# Patient Record
Sex: MALE | Race: BLACK OR AFRICAN AMERICAN
[De-identification: names, ages, dates, MRNs, and addresses within clinical notes are randomized per-mention and may not be internally consistent; named-entity substitution may affect disease eponyms.]

---

## 2018-09-04 ENCOUNTER — HOSPITAL ENCOUNTER (EMERGENCY)
Dept: HOSPITAL 62 - ER | Age: 33
Discharge: LEFT BEFORE DISCHARGE | End: 2018-09-04
Payer: SELF-PAY

## 2018-09-04 DIAGNOSIS — R10.9: Primary | ICD-10-CM

## 2018-09-04 DIAGNOSIS — Z53.21: ICD-10-CM

## 2019-09-16 ENCOUNTER — HOSPITAL ENCOUNTER (EMERGENCY)
Dept: HOSPITAL 62 - ER | Age: 34
Discharge: HOME | End: 2019-09-16
Payer: SELF-PAY

## 2019-09-16 VITALS — SYSTOLIC BLOOD PRESSURE: 130 MMHG | DIASTOLIC BLOOD PRESSURE: 89 MMHG

## 2019-09-16 DIAGNOSIS — F17.200: ICD-10-CM

## 2019-09-16 DIAGNOSIS — H00.014: Primary | ICD-10-CM

## 2019-09-16 PROCEDURE — 99283 EMERGENCY DEPT VISIT LOW MDM: CPT

## 2019-09-16 NOTE — ER DOCUMENT REPORT
ED Eye Complaint





- General


Chief Complaint: Eye Problem


Stated Complaint: LEFT EYE PAIN, IRRITATION


TRAVEL OUTSIDE OF THE [place] IN LAST 30 DAYS: No





- HPI


Patient complains to provider of: left eye irritation


Onset: Last week


Eye location: Left


Occurred at: Home


Quality of pain: Burning


Severity: Mild


Pain Level: Denies


Safety glasses worn: No


Contact lenses worn: No


Associated symptoms: None


Notes: 





33 year old male has tried lid scrubs and warm compress to left upper eye lid 

syte for a week.  No really improving.  No fever or chills.  No contact lens. 





- Related Data


Allergies/Adverse Reactions: 


                                        





No Known Allergies Allergy (Verified 09/16/19 07:50)


   











Past Medical History





- Social History


Smoking Status: Current Every Day Smoker


Family History: None


Patient has suicidal ideation: No


Patient has homicidal ideation: No


Endocrine Medical History: Reports: Hx Diabetes Mellitus Type 2 - borderline


Renal/ Medical History: Denies: Hx Peritoneal Dialysis





Review of Systems





- Review of Systems


Constitutional: No symptoms reported


EENT: See HPI


Cardiovascular: No symptoms reported


Respiratory: No symptoms reported


Gastrointestinal: No symptoms reported


Genitourinary: No symptoms reported


Male Genitourinary: No symptoms reported


Musculoskeletal: No symptoms reported


Skin: No symptoms reported


Hematologic/Lymphatic: No symptoms reported


Neurological/Psychological: No symptoms reported





Physical Exam





- Vital signs


Vitals: 


                                        











Temp Pulse Resp BP Pulse Ox


 


 97.9 F   87   20   138/80 H  97 


 


 09/16/19 07:53  09/16/19 07:53  09/16/19 07:53  09/16/19 07:53  09/16/19 07:53











Interpretation: Normal





- General


General appearance: Appears well, Alert





- HEENT


Head: Normocephalic, Atraumatic


Eyes: Other - mild erythema and sts consistent with hordeolum left upper lid


Pupils: PERRL


Visual acuity- Right eye: 20/30


Visual acuity- Left eye: 20/40


Corrective lenses worn: No





- Respiratory


Respiratory status: No respiratory distress


Chest status: Nontender


Breath sounds: Normal


Chest palpation: Normal





- Cardiovascular


Rhythm: Regular


Heart sounds: Normal auscultation


Murmur: No





- Abdominal


Inspection: Normal


Distension: No distension


Bowel sounds: Normal


Tenderness: Nontender


Organomegaly: No organomegaly





- Back


Back: Nontender





- Extremities


General upper extremity: Normal inspection, Nontender, Normal color, Normal ROM,

Normal temperature


General lower extremity: Normal inspection, Nontender, Normal color, Normal ROM,

Normal temperature, Normal weight bearing.  No: Maynor's sign





- Neurological


Neuro grossly intact: Yes


Cognition: Normal


Orientation: AAOx4


[place] Coma Scale Eye Opening: Spontaneous


[place] Coma Scale Verbal: Oriented


[place] Coma Scale Motor: Obeys Commands


[place] Coma Scale Total: 15


Speech: Normal


Motor strength normal: LUE, RUE, LLE, RLE


Sensory: Normal





- Psychological


Associated symptoms: Normal affect, Normal mood





- Skin


Skin Temperature: Warm


Skin Moisture: Dry


Skin Color: Normal





Course





- Vital Signs


Vital signs: 


                                        











Temp Pulse Resp BP Pulse Ox


 


 97.9 F   87   20   138/80 H  97 


 


 09/16/19 07:53  09/16/19 07:53  09/16/19 07:53  09/16/19 07:53  09/16/19 07:53














Discharge





- Discharge


Clinical Impression: 


Hordeolum externum (stye)


Qualifiers:


 Laterality: left Eyelid: upper Qualified Code(s): H00.014 - Hordeolum externum 

left upper eyelid





Condition: Good


Disposition: HOME, SELF-CARE


Instructions:  Antibiotic Therapy ([facility]), Eyedrop Use ([facility])


Additional Instructions: 


Take the medicine as directed.  Please return here for any problems or concerns.




Prescriptions: 


Erythromycin Base [Erythromycin Oph 1 gm Oint Ud] 1 applic OP QID #1 tube


Cephalexin Monohydrate [Keflex 500 mg Capsule] 500 mg PO TID #30 capsule

## 2020-11-25 ENCOUNTER — HOSPITAL ENCOUNTER (EMERGENCY)
Dept: HOSPITAL 62 - ER | Age: 35
Discharge: HOME | End: 2020-11-25
Payer: SELF-PAY

## 2020-11-25 VITALS — SYSTOLIC BLOOD PRESSURE: 124 MMHG | DIASTOLIC BLOOD PRESSURE: 95 MMHG

## 2020-11-25 DIAGNOSIS — F17.210: ICD-10-CM

## 2020-11-25 DIAGNOSIS — H57.89: ICD-10-CM

## 2020-11-25 DIAGNOSIS — G44.209: Primary | ICD-10-CM

## 2020-11-25 DIAGNOSIS — R50.9: ICD-10-CM

## 2020-11-25 DIAGNOSIS — F41.9: ICD-10-CM

## 2020-11-25 DIAGNOSIS — E11.9: ICD-10-CM

## 2020-11-25 DIAGNOSIS — H57.12: ICD-10-CM

## 2020-11-25 LAB
ADD MANUAL DIFF: NO
ALBUMIN SERPL-MCNC: 4.1 G/DL (ref 3.5–5)
ALP SERPL-CCNC: 84 U/L (ref 38–126)
ANION GAP SERPL CALC-SCNC: 8 MMOL/L (ref 5–19)
AST SERPL-CCNC: 17 U/L (ref 17–59)
BASOPHILS # BLD AUTO: 0 10^3/UL (ref 0–0.2)
BASOPHILS NFR BLD AUTO: 0.5 % (ref 0–2)
BILIRUB DIRECT SERPL-MCNC: 0.1 MG/DL (ref 0–0.4)
BILIRUB SERPL-MCNC: 0.6 MG/DL (ref 0.2–1.3)
BUN SERPL-MCNC: 13 MG/DL (ref 7–20)
CALCIUM: 9.2 MG/DL (ref 8.4–10.2)
CHLORIDE SERPL-SCNC: 103 MMOL/L (ref 98–107)
CO2 SERPL-SCNC: 29 MMOL/L (ref 22–30)
CRP SERPL-MCNC: 5.1 MG/L (ref ?–10)
EOSINOPHIL # BLD AUTO: 0 10^3/UL (ref 0–0.6)
EOSINOPHIL NFR BLD AUTO: 0.6 % (ref 0–6)
ERYTHROCYTE [DISTWIDTH] IN BLOOD BY AUTOMATED COUNT: 14.7 % (ref 11.5–14)
ERYTHROCYTE [SEDIMENTATION RATE] IN BLOOD: 17 MM/HR (ref 0–15)
GLUCOSE SERPL-MCNC: 116 MG/DL (ref 75–110)
HCT VFR BLD CALC: 42.4 % (ref 37.9–51)
HGB BLD-MCNC: 14.2 G/DL (ref 13.5–17)
LYMPHOCYTES # BLD AUTO: 2 10^3/UL (ref 0.5–4.7)
LYMPHOCYTES NFR BLD AUTO: 30 % (ref 13–45)
MCH RBC QN AUTO: 30.6 PG (ref 27–33.4)
MCHC RBC AUTO-ENTMCNC: 33.5 G/DL (ref 32–36)
MCV RBC AUTO: 91 FL (ref 80–97)
MONOCYTES # BLD AUTO: 0.8 10^3/UL (ref 0.1–1.4)
MONOCYTES NFR BLD AUTO: 11.3 % (ref 3–13)
NEUTROPHILS # BLD AUTO: 3.9 10^3/UL (ref 1.7–8.2)
NEUTS SEG NFR BLD AUTO: 57.6 % (ref 42–78)
PLATELET # BLD: 212 10^3/UL (ref 150–450)
POTASSIUM SERPL-SCNC: 3.8 MMOL/L (ref 3.6–5)
PROT SERPL-MCNC: 7 G/DL (ref 6.3–8.2)
RBC # BLD AUTO: 4.65 10^6/UL (ref 4.35–5.55)
TOTAL CELLS COUNTED % (AUTO): 100 %
WBC # BLD AUTO: 6.7 10^3/UL (ref 4–10.5)

## 2020-11-25 PROCEDURE — 99284 EMERGENCY DEPT VISIT MOD MDM: CPT

## 2020-11-25 PROCEDURE — 85025 COMPLETE CBC W/AUTO DIFF WBC: CPT

## 2020-11-25 PROCEDURE — 96374 THER/PROPH/DIAG INJ IV PUSH: CPT

## 2020-11-25 PROCEDURE — 86140 C-REACTIVE PROTEIN: CPT

## 2020-11-25 PROCEDURE — 96361 HYDRATE IV INFUSION ADD-ON: CPT

## 2020-11-25 PROCEDURE — 36415 COLL VENOUS BLD VENIPUNCTURE: CPT

## 2020-11-25 PROCEDURE — 96375 TX/PRO/DX INJ NEW DRUG ADDON: CPT

## 2020-11-25 PROCEDURE — 80053 COMPREHEN METABOLIC PANEL: CPT

## 2020-11-25 PROCEDURE — 85652 RBC SED RATE AUTOMATED: CPT

## 2020-11-25 NOTE — ER DOCUMENT REPORT
Entered by ANDREW BLAKELY SCRIBE  11/25/20 0747 





Acting as scribe for:JESSENIA STONE MD





ED Headache





- General


Chief Complaint: Headache >24 hrs old


Stated Complaint: HEADACHE


Time Seen by Provider: 11/25/20 07:38


Mode of Arrival: Ambulatory


Information source: Patient


Notes: 





This 35 year old male patient presents to the ED today with complaints of a 

intermittent left-sided headache for the past x1 week, worse this morning. 

Patient reports associated pressure behind his left eye and states that his left

eyelid has been swelling shut intermittently for the past x1 year. He notes that

the swelling usually resolves after applying some type of antihistamine eye dr baugh Denies fever.





TRAVEL OUTSIDE OF THE [place] IN LAST 30 DAYS: No





- Related Data


Allergies/Adverse Reactions: 


                                        





No Known Allergies Allergy (Verified 11/25/20 06:07)


   











Past Medical History





- General


Information source: Patient, [facility] Records





- Social History


Smoking Status: Current Every Day Smoker


Cigarette use (# per day): Yes - 0.5 ppd


Chew tobacco use (# tins/day): No


Smoking Education Provided: No


Frequency of alcohol use: None


Drug Abuse: None


Lives with: Spouse/Significant other


Family History: Reviewed & Not Pertinent


Patient has suicidal ideation: No


Patient has homicidal ideation: No


Endocrine Medical History: Reports: Hx Diabetes Mellitus Type 2 - borderline





Review of Systems





- Review of Systems


Constitutional: See HPI, Fever


EENT: See HPI, Eye pain - and eyelid swelling


Cardiovascular: No symptoms reported


Respiratory: No symptoms reported


Gastrointestinal: No symptoms reported


Genitourinary: No symptoms reported


Male Genitourinary: No symptoms reported


Musculoskeletal: No symptoms reported


Skin: No symptoms reported


Hematologic/Lymphatic: No symptoms reported


Neurological/Psychological: See HPI, Headaches


-: Yes All other systems reviewed and negative





Physical Exam





- Vital signs


Vitals: 


                                        











Temp Pulse Resp BP Pulse Ox


 


 98.0 F   62   16   130/115 H  99 


 


 11/25/20 06:04  11/25/20 06:04  11/25/20 06:04  11/25/20 06:04  11/25/20 06:04














- General


General appearance: Alert, Anxious





- HEENT


Head: Normocephalic, Atraumatic, Tenderness - Left scalp from front to back is 

very tender to palpate. Right scalp is nontender. Patient becomes anxious and 

diaphoretic with continued palpation of his scalp.


Eyes: Other - Palpating the globe of the left eye is very painful


Pupils: PERRL


Neck: Other - Posterior cervical musculature tenderness to palpation





- Respiratory


Respiratory status: No respiratory distress


Chest status: Nontender


Breath sounds: Normal


Chest palpation: Normal





- Cardiovascular


Rhythm: Regular


Heart sounds: Normal auscultation


Murmur: No


Friction rub: No


Gallop: None auscultated





- Abdominal


Inspection: Normal


Distension: No distension


Bowel sounds: Normal


Tenderness: Nontender - Abdomen soft


Organomegaly: No organomegaly





- Back


Back: Normal, Nontender





- Extremities


General upper extremity: Normal inspection


General lower extremity: Normal inspection.  No: Edema





- Neurological


Neuro grossly intact: Yes


Orientation: AAOx4


[place] Coma Scale Eye Opening: Spontaneous


[place] Coma Scale Verbal: Oriented


[place] Coma Scale Motor: Obeys Commands


[place] Coma Scale Total: 15





- Psychological


Associated symptoms: Anxious





- Skin


Skin Temperature: Warm


Skin Moisture: Dry


Skin Color: Normal





Course





- Re-evaluation


Re-evalutation: 





11/25/20 09:07


Patient has had about 500 mils normal saline IV, 25 mg Benadryl IV and 10 mg 

Compazine IV.  He was napping.  When I woke him up, he said his headache feels 

much better and his eye was not hurting.  Palpating the scalp of his head shows 

only minimal tenderness on the left side now and posterior cervical muscles are 

only a little tender on the left side.  Palpating the left eye globe is only 

slightly tender.





I did attempt to get IOP from the left eye after placing tetracaine drops.  

There seems to be a malfunction of the machine because the amount of pressure re

quired to get it to click was excessive.  I did not push hard enough to get to 

click on his eye, but I tried it on semifirm surfaces and found that the 

pressure required was far above what I am used to.  Given that his eye is not 

really red, is not tearing, is greatly improved along with his obvious muscle 

contraction/tension headache, and the pains have been intermittent for quite 

some time, it is unlikely this is an intraocular pressure problem.


11/25/20 10:09


Patient was sleeping.  I woke him back up.  He states his headache is gone, he 

feels much better.  He states he really did not get much sleep last night.





- Vital Signs


Vital signs: 


                                        











Temp Pulse Resp BP Pulse Ox


 


 98.0 F   62   16   124/95 H  99 


 


 11/25/20 06:04  11/25/20 06:04  11/25/20 06:04  11/25/20 09:02  11/25/20 06:04














- Laboratory


Result Diagrams: 


                                 11/25/20 08:44





                                 11/25/20 08:44


Laboratory results interpreted by me: 


                                        











  11/25/20 11/25/20





  08:44 08:44


 


RDW  14.7 H 


 


ESR  17 H 


 


Glucose   116 H














Discharge





- Discharge


Clinical Impression: 


 Muscle tension headache





Condition: Stable


Disposition: HOME, SELF-CARE


Additional Instructions: 


Tension Headache:





     Your problem has been diagnosed as muscle tension headache.  This very 

common type of headache occurs because of tightness in the muscles of the head 

and neck.  The cause may be neck or jaw joint problems, but most commonly the 

cause is emotional stress.  The headache may last hours or days.


     The treatment of uncomplicated tension headaches is rest and pain 

medication.  Often, the newer antiinflammatory pain medications are prescribed, 

as these also decrease the irritability of the painful tissues.  Muscle 

relaxers, cold packs, or warm packs are sometimes helpful.  Anti-anxiety medic

ation or narcotics are sometimes needed temporarily, but are best avoided in the

long run.


     Your doctor has evaluated your headache problem, and finds no evidence of a

serious health problem as a cause for the headache.


     If your headache becomes more severe, or if new symptoms develop (such as 

fever, stiff neck, vomiting, or decreasing alertness) you should be re-examined 

by the physician.





 

********************************************************************************


***************************************





Try the Fioricet as prescribed for your tension headaches.


If you find that does not relieve the headache, then take one of the Compazine 

10mg along with an over-the-counter Benadryl 25 mg, and lay down and try to 

sleep in a cool, dark, quiet room.





Follow-up with your primary care provider if you continue to have these 

headaches.





RETURN TO THE EMERGENCY ROOM IF ANY NEW OR WORSENING SYMPTOMS.








Prescriptions: 


Prochlorperazine Maleate [Compazine 10 mg Tablet] 10 mg PO ASDIR PRN #10 tablet


 PRN Reason: 


Butalb/Acetaminophen/Caffeine [Fioricet (-40 mg) Tablet] 2 tab PO Q4H PRN 

#20 tab


 PRN Reason: 


Forms:  Return to Work





I personally performed the services described in the documentation, reviewed and

edited the documentation which was dictated to the scribe in my presence, and it

accurately records my words and actions.